# Patient Record
Sex: MALE | Race: BLACK OR AFRICAN AMERICAN | NOT HISPANIC OR LATINO | Employment: STUDENT | URBAN - METROPOLITAN AREA
[De-identification: names, ages, dates, MRNs, and addresses within clinical notes are randomized per-mention and may not be internally consistent; named-entity substitution may affect disease eponyms.]

---
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
## 2024-05-19 ENCOUNTER — APPOINTMENT (OUTPATIENT)
Dept: GENERAL RADIOLOGY | Facility: CLINIC | Age: 19
End: 2024-05-19
Attending: EMERGENCY MEDICINE
Payer: COMMERCIAL

## 2024-05-19 ENCOUNTER — HOSPITAL ENCOUNTER (EMERGENCY)
Facility: CLINIC | Age: 19
Discharge: HOME | End: 2024-05-19
Attending: EMERGENCY MEDICINE | Admitting: EMERGENCY MEDICINE
Payer: COMMERCIAL

## 2024-05-19 VITALS
WEIGHT: 130 LBS | HEART RATE: 66 BPM | DIASTOLIC BLOOD PRESSURE: 81 MMHG | OXYGEN SATURATION: 98 % | RESPIRATION RATE: 16 BRPM | TEMPERATURE: 98.2 F | SYSTOLIC BLOOD PRESSURE: 123 MMHG

## 2024-05-19 DIAGNOSIS — S90.112A CONTUSION OF LEFT GREAT TOE WITHOUT DAMAGE TO NAIL, INITIAL ENCOUNTER: ICD-10-CM

## 2024-05-19 PROCEDURE — 99283 EMERGENCY DEPT VISIT LOW MDM: CPT

## 2024-05-19 PROCEDURE — 73660 X-RAY EXAM OF TOE(S): CPT | Mod: LT

## 2024-05-19 ASSESSMENT — COLUMBIA-SUICIDE SEVERITY RATING SCALE - C-SSRS
2. HAVE YOU ACTUALLY HAD ANY THOUGHTS OF KILLING YOURSELF IN THE PAST MONTH?: NO
6. HAVE YOU EVER DONE ANYTHING, STARTED TO DO ANYTHING, OR PREPARED TO DO ANYTHING TO END YOUR LIFE?: NO
1. IN THE PAST MONTH, HAVE YOU WISHED YOU WERE DEAD OR WISHED YOU COULD GO TO SLEEP AND NOT WAKE UP?: NO

## 2024-05-19 ASSESSMENT — ACTIVITIES OF DAILY LIVING (ADL)
ADLS_ACUITY_SCORE: 35
ADLS_ACUITY_SCORE: 33

## 2024-05-19 NOTE — LETTER
May 19, 2024      To Whom It May Concern:      Fredrick Cortes was seen in our Emergency Department today, 05/19/24.  I expect his condition to improve over the next 1 day.  He may return to work/school when improved.    Sincerely,        Rodolfo Lo RN

## 2024-05-19 NOTE — ED TRIAGE NOTES
Pt c/o L big toe injury starting yesterday am. Pt states he was playing soccer when he injured his L big toe.      Triage Assessment (Adult)       Row Name 05/19/24 0314          Triage Assessment    Airway WDL WDL        Respiratory WDL    Respiratory WDL WDL        Skin Circulation/Temperature WDL    Skin Circulation/Temperature WDL WDL        Cardiac WDL    Cardiac WDL WDL        Peripheral/Neurovascular WDL    Peripheral Neurovascular WDL WDL        Cognitive/Neuro/Behavioral WDL    Cognitive/Neuro/Behavioral WDL WDL

## 2024-05-19 NOTE — ED PROVIDER NOTES
History   Chief Complaint:  L great toe injury    HPI     Fredrick Cortes is a 18 year old male who presents for evaluation of a left great toe injury that he sustained yesterday while he was playing soccer at a local [employer] with some friends while wearing only socks.  When he took off his socks, he saw some bruising to his left great toe, and he came here with a specific question of whether he might of broken his toe.  No other injuries.  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  He is not taking any pain medication and does not want any.  No prior toe problems.  No other major medical problems.    Medications:    No current outpatient medications on file.    Past Medical History:    Past Medical History:   Diagnosis Date    Other  infants, unspecified (weight)(765.10)     Twin birth, mate liveborn, born outside hospital and not hospitalized        Patient Active Problem List    Diagnosis Date Noted    Myopia 2014     Priority: Medium    Speech delay 2014     Priority: Medium    Dental caries 2013     Priority: Medium    Learning difficulty 2013     Priority: Medium    Anorexia/ underweight 2010     Priority: Medium    Esotropia/ left 2009     Priority: Medium    Other  infants, unspecified (weight)(765.10)      Priority: Medium     4 lb at birth          Physical Exam   Patient Vitals for the past 24 hrs:   BP Temp Temp src Pulse Resp SpO2 Weight   24 0313 131/69 98.2  F (36.8  C) Oral 64 16 97 % 59 kg (130 lb)      Physical Exam  General: Nontoxic-appearing male sitting upright in room 12  HENT: MMM, no signs of facial trauma  CV:  regular rhythm, soft compartments in LLE, cap refill normal in L great toe  Resp: normal effort, speaks in full phrases  MSK:  Extremities: Mild diffuse discomfort to the left toe without palpable deformity, very minimal swelling, can flex and extend left great toe IP joint and MTP  Other toes and left foot are nontender  Skin:   Moderate  ecchymosis covering approximately half of his left great toe, no evidence of toenail injury, no subungual hematoma present, no open skin wounds  Neuro: awake, alert, responds appropriately to commands, SILT throughout left lower extremity  Psych: cooperative    Emergency Department Course   Imaging:  XR Toe Left G/E 2 Views   Final Result   IMPRESSION: No evidence of acute fracture or dislocation.               Emergency Department Course:  Reviewed:  I reviewed nursing notes, vitals, and past medical history    Assessments/Consultations/Discussion of Management or Tests :  I obtained history and examined the patient as noted above.        Independent Interpretation (X-rays, CTs, rhythm strip):  I personally reviewed L toe xray images, I see no fracture or dislocation.    Interventions:  Medications - No data to display   Hard soled postop shoe was provided in the emergency department    Social Determinants of Health affecting care:   Healthcare Access/Compliance    Disposition:  Discharged    Impression & Plan    Medical Decision Making:  He presents with isolated injury to his left great toe.  He specifically wonders whether he might have had a fracture, and I considered the possibility of fracture or dislocation but fortunately x-ray and exam do not reveal any evidence of these possibilities.  Presentation consistent with sprain and contusion to the left great toe.  He has good range of motion, is neurovascularly intact, and politely declined my offer of any analgesics.  However, he was interested in a postop shoe that he can use as needed for comfort.  I think he will do well with supportive cares, follow-up through clinic as needed.  Return here for sudden worsening at any hour.  No open wounds.    Diagnosis:    ICD-10-CM    1. Contusion of left great toe without damage to nail, initial encounter  S90.112A          5/19/2024   MD Jesus Chairez Jeffrey Alan, MD  05/19/24 0516

## 2024-05-19 NOTE — DISCHARGE INSTRUCTIONS
Your xray does not show any broken bones in your toe.  Use the rigid sandal as needed for comfort, along with ibuprofen (600mg up to 3 times daily) and/or Tylenol (acetaminophen; not more than 4,000mg in any 24hr period) as needed for pain.

## 2025-08-03 ENCOUNTER — OFFICE VISIT (OUTPATIENT)
Dept: URGENT CARE | Facility: URGENT CARE | Age: 20
End: 2025-08-03
Payer: COMMERCIAL

## 2025-08-03 VITALS
HEIGHT: 68 IN | SYSTOLIC BLOOD PRESSURE: 134 MMHG | WEIGHT: 143.1 LBS | HEART RATE: 61 BPM | DIASTOLIC BLOOD PRESSURE: 85 MMHG | BODY MASS INDEX: 21.69 KG/M2 | RESPIRATION RATE: 19 BRPM | OXYGEN SATURATION: 94 % | TEMPERATURE: 98.6 F

## 2025-08-03 DIAGNOSIS — L02.211 CUTANEOUS ABSCESS OF ABDOMINAL WALL: Primary | ICD-10-CM

## 2025-08-03 PROCEDURE — 3075F SYST BP GE 130 - 139MM HG: CPT | Performed by: EMERGENCY MEDICINE

## 2025-08-03 PROCEDURE — 3079F DIAST BP 80-89 MM HG: CPT | Performed by: EMERGENCY MEDICINE

## 2025-08-03 PROCEDURE — 99203 OFFICE O/P NEW LOW 30 MIN: CPT | Performed by: EMERGENCY MEDICINE

## 2025-08-03 RX ORDER — SULFAMETHOXAZOLE AND TRIMETHOPRIM 800; 160 MG/1; MG/1
1 TABLET ORAL 2 TIMES DAILY
Qty: 10 TABLET | Refills: 0 | Status: SHIPPED | OUTPATIENT
Start: 2025-08-03 | End: 2025-08-08

## 2025-08-03 RX ORDER — CEPHALEXIN 500 MG/1
500 CAPSULE ORAL 2 TIMES DAILY
Qty: 10 CAPSULE | Refills: 0 | Status: SHIPPED | OUTPATIENT
Start: 2025-08-03 | End: 2025-08-08

## 2025-08-07 ENCOUNTER — OFFICE VISIT (OUTPATIENT)
Dept: URGENT CARE | Facility: URGENT CARE | Age: 20
End: 2025-08-07
Payer: COMMERCIAL

## 2025-08-07 VITALS
SYSTOLIC BLOOD PRESSURE: 126 MMHG | OXYGEN SATURATION: 96 % | WEIGHT: 142 LBS | BODY MASS INDEX: 21.91 KG/M2 | RESPIRATION RATE: 18 BRPM | TEMPERATURE: 97.9 F | HEART RATE: 73 BPM | DIASTOLIC BLOOD PRESSURE: 86 MMHG

## 2025-08-07 DIAGNOSIS — L02.211 ABSCESS OF ABDOMINAL WALL: Primary | ICD-10-CM

## 2025-08-07 RX ORDER — ACETAMINOPHEN 500 MG
500-1000 TABLET ORAL EVERY 6 HOURS PRN
Qty: 60 TABLET | Refills: 0 | Status: SHIPPED | OUTPATIENT
Start: 2025-08-07 | End: 2025-09-06